# Patient Record
Sex: FEMALE | Race: BLACK OR AFRICAN AMERICAN | NOT HISPANIC OR LATINO | URBAN - METROPOLITAN AREA
[De-identification: names, ages, dates, MRNs, and addresses within clinical notes are randomized per-mention and may not be internally consistent; named-entity substitution may affect disease eponyms.]

---

## 2020-10-26 ENCOUNTER — OFFICE VISIT (OUTPATIENT)
Dept: URBAN - METROPOLITAN AREA CLINIC 23 | Facility: CLINIC | Age: 40
End: 2020-10-26

## 2020-11-10 ENCOUNTER — OFFICE VISIT (OUTPATIENT)
Dept: URBAN - METROPOLITAN AREA CLINIC 23 | Facility: CLINIC | Age: 40
End: 2020-11-10
Payer: COMMERCIAL

## 2020-11-10 ENCOUNTER — WEB ENCOUNTER (OUTPATIENT)
Dept: URBAN - METROPOLITAN AREA CLINIC 23 | Facility: CLINIC | Age: 40
End: 2020-11-10

## 2020-11-10 ENCOUNTER — LAB OUTSIDE AN ENCOUNTER (OUTPATIENT)
Dept: URBAN - METROPOLITAN AREA CLINIC 23 | Facility: CLINIC | Age: 40
End: 2020-11-10

## 2020-11-10 DIAGNOSIS — R68.81 EARLY SATIETY: ICD-10-CM

## 2020-11-10 DIAGNOSIS — K21.00 GASTROESOPHAGEAL REFLUX DISEASE WITH ESOPHAGITIS WITHOUT HEMORRHAGE: ICD-10-CM

## 2020-11-10 DIAGNOSIS — A04.8 H. PYLORI INFECTION: ICD-10-CM

## 2020-11-10 PROCEDURE — G8427 DOCREV CUR MEDS BY ELIG CLIN: HCPCS | Performed by: INTERNAL MEDICINE

## 2020-11-10 PROCEDURE — G8420 CALC BMI NORM PARAMETERS: HCPCS | Performed by: INTERNAL MEDICINE

## 2020-11-10 PROCEDURE — G9622 NO UNHEAL ETOH USER: HCPCS | Performed by: INTERNAL MEDICINE

## 2020-11-10 PROCEDURE — 1036F TOBACCO NON-USER: CPT | Performed by: INTERNAL MEDICINE

## 2020-11-10 PROCEDURE — 99214 OFFICE O/P EST MOD 30 MIN: CPT | Performed by: INTERNAL MEDICINE

## 2020-11-10 PROCEDURE — 3017F COLORECTAL CA SCREEN DOC REV: CPT | Performed by: INTERNAL MEDICINE

## 2020-11-10 PROCEDURE — G9903 PT SCRN TBCO ID AS NON USER: HCPCS | Performed by: INTERNAL MEDICINE

## 2020-11-10 RX ORDER — HYDROXYCHLOROQUINE SULFATE 200 MG/1
TAKE 1 TABLET (200 MG) BY ORAL ROUTE ONCE DAILY TABLET ORAL 1
Qty: 0 | Refills: 0 | Status: ACTIVE | COMMUNITY
Start: 1900-01-01 | End: 1900-01-01

## 2020-11-10 RX ORDER — METHOTREXATE 2.5 MG/1
TABLET ORAL
Qty: 0 | Refills: 0 | Status: ACTIVE | COMMUNITY
Start: 1900-01-01 | End: 1900-01-01

## 2020-11-10 RX ORDER — PREDNISONE 5 MG/1
TABLET ORAL
Qty: 0 | Refills: 0 | Status: ACTIVE | COMMUNITY
Start: 1900-01-01 | End: 1900-01-01

## 2020-11-10 RX ORDER — PANTOPRAZOLE SODIUM 40 MG/1
TAKE 1 TABLET BY ORAL ROUTE BID FOR 90 DAYS, 30 MINS BEFORE BREAKFAST AND DINNER TABLET, DELAYED RELEASE ORAL 2
Qty: 180 | Refills: 1 | Status: ACTIVE | COMMUNITY
Start: 2020-05-14 | End: 2020-11-10

## 2020-11-10 NOTE — HPI-TODAY'S VISIT:
41-year-old -American female with lupus who presents for follow-up of GERD.  She had EGD and colonoscopy done for anemia in May 2020, she was found to have moderate stenosis at the GE junction with Schatzki's ring.  Which was dilated with TTS balloon up to 15 mm.  She denies any dysphagia at this moment.   Biopsy from the stomach revealed H. pylori positive, completed 2 weeks of antibiotic treatment without side effect.  Her major complaint today is uncontrolled reflux symptoms.  She has been taking pantoprazole twice a day and also fasting 3-4 hours before bedtime.  Still she has heartburn frequently especially when she lies down.   She also reports early satiety and chronic indigestion.  Not feeling hungry for many hours after small meal. She also figured she has lactose intolerance.  She has been using Lactaid milk which is helping.  Also avoiding foods with lactose.

## 2020-11-10 NOTE — PREVIOUS WORKUP REVIEWED
:ENDOSCOPIES: -EGD 5/14/2020: moderately severe esophagitis. A low-grade the narrowing Schatzki's ring at GE junction. Dilated up to 15 mm using balloon dilator. A small hiatal hernia. Minimal inflammation in the stomach. Normal duodenum.-Colonoscopy 5/14/2020: normal TI. A large fecal lith in the cecum. Stick to the mucosa. Diverticulosis in the sigmoid colon.*Pathology: duodenum-chronic duodenitis. Gastric body and antrum-H. pylori gastritis. No intestinal metaplasia or dysplasia. GE junction-reflux associated changes. No intestinal metaplasia.LABS: -Labs 4/18/2020: WBC 4.2, hemoglobin 11.4, platelet 197. Total bilirubin 0.6, alkaline phosphatase 31, , ALT 17, BUN 10, creatinine 0.75.

## 2020-11-26 ENCOUNTER — TELEPHONE ENCOUNTER (OUTPATIENT)
Dept: URBAN - METROPOLITAN AREA CLINIC 23 | Facility: CLINIC | Age: 40
End: 2020-11-26

## 2020-11-26 LAB — H PYLORI BREATH TEST: POSITIVE

## 2020-11-26 RX ORDER — OMEPRAZOLE 20 MG/1
1 CAPSULE 30 MINUTES BEFORE MEAL CAPSULE, DELAYED RELEASE ORAL BID
Qty: 28 | Refills: 0 | OUTPATIENT

## 2020-11-26 RX ORDER — BISMUTH SUBSALICYLATE 262 MG/1
2 TABLETS TABLET, CHEWABLE ORAL QID
Qty: 112 TABLET | Refills: 0 | OUTPATIENT
Start: 2020-11-26 | End: 2020-12-10

## 2020-11-26 RX ORDER — TETRACYCLINE HYDROCHLORIDE 500 MG/1
1 CAPSULE CAPSULE ORAL QID
Qty: 56 CAPSULE | Refills: 0 | OUTPATIENT
Start: 2020-11-26 | End: 2020-12-10

## 2020-11-26 RX ORDER — METRONIDAZOLE 500 MG/1
1 TABLET TABLET ORAL QID
Qty: 56 TABLET | Refills: 0 | OUTPATIENT
Start: 2020-11-26 | End: 2020-12-10

## 2020-12-02 ENCOUNTER — TELEPHONE ENCOUNTER (OUTPATIENT)
Dept: URBAN - METROPOLITAN AREA CLINIC 77 | Facility: CLINIC | Age: 40
End: 2020-12-02

## 2020-12-02 RX ORDER — DOXYCYCLINE HYCLATE 100 MG/1
1 TABLET CAPSULE, GELATIN COATED ORAL TWICE A DAY
Qty: 28 TABLET | Refills: 0 | OUTPATIENT
Start: 2020-12-03 | End: 2020-12-17

## 2020-12-04 ENCOUNTER — TELEPHONE ENCOUNTER (OUTPATIENT)
Dept: URBAN - METROPOLITAN AREA CLINIC 23 | Facility: CLINIC | Age: 40
End: 2020-12-04

## 2020-12-11 ENCOUNTER — TELEPHONE ENCOUNTER (OUTPATIENT)
Dept: URBAN - METROPOLITAN AREA CLINIC 77 | Facility: CLINIC | Age: 40
End: 2020-12-11

## 2020-12-23 ENCOUNTER — OFFICE VISIT (OUTPATIENT)
Dept: URBAN - METROPOLITAN AREA CLINIC 78 | Facility: CLINIC | Age: 40
End: 2020-12-23
Payer: COMMERCIAL

## 2020-12-23 VITALS
BODY MASS INDEX: 28.41 KG/M2 | HEART RATE: 80 BPM | HEIGHT: 64 IN | WEIGHT: 166.4 LBS | TEMPERATURE: 97.7 F | SYSTOLIC BLOOD PRESSURE: 113 MMHG | DIASTOLIC BLOOD PRESSURE: 79 MMHG

## 2020-12-23 DIAGNOSIS — A04.8 H. PYLORI INFECTION: ICD-10-CM

## 2020-12-23 DIAGNOSIS — K21.9 CHRONIC GERD: ICD-10-CM

## 2020-12-23 DIAGNOSIS — K22.2 ESOPHAGEAL STENOSIS: ICD-10-CM

## 2020-12-23 PROCEDURE — 3017F COLORECTAL CA SCREEN DOC REV: CPT | Performed by: INTERNAL MEDICINE

## 2020-12-23 PROCEDURE — 1036F TOBACCO NON-USER: CPT | Performed by: INTERNAL MEDICINE

## 2020-12-23 PROCEDURE — G9622 NO UNHEAL ETOH USER: HCPCS | Performed by: INTERNAL MEDICINE

## 2020-12-23 PROCEDURE — G9903 PT SCRN TBCO ID AS NON USER: HCPCS | Performed by: INTERNAL MEDICINE

## 2020-12-23 PROCEDURE — G8420 CALC BMI NORM PARAMETERS: HCPCS | Performed by: INTERNAL MEDICINE

## 2020-12-23 PROCEDURE — 99213 OFFICE O/P EST LOW 20 MIN: CPT | Performed by: INTERNAL MEDICINE

## 2020-12-23 PROCEDURE — G8427 DOCREV CUR MEDS BY ELIG CLIN: HCPCS | Performed by: INTERNAL MEDICINE

## 2020-12-23 RX ORDER — HYDROXYCHLOROQUINE SULFATE 200 MG/1
TAKE 1 TABLET (200 MG) BY ORAL ROUTE ONCE DAILY TABLET ORAL 1
Qty: 0 | Refills: 0 | Status: ACTIVE | COMMUNITY
Start: 1900-01-01

## 2020-12-23 RX ORDER — METHOTREXATE 2.5 MG/1
TABLET ORAL
Qty: 0 | Refills: 0 | Status: ACTIVE | COMMUNITY
Start: 1900-01-01

## 2020-12-23 RX ORDER — PREDNISONE 5 MG/1
TABLET ORAL
Qty: 0 | Refills: 0 | Status: ACTIVE | COMMUNITY
Start: 1900-01-01

## 2020-12-23 NOTE — PREVIOUS WORKUP REVIEWED
:, :ENDOSCOPIES: -EGD 5/14/2020: moderately severe esophagitis. A low-grade the narrowing Schatzki's ring at GE junction. Dilated up to 15 mm using balloon dilator. A small hiatal hernia. Minimal inflammation in the stomach. Normal duodenum.-Colonoscopy 5/14/2020: normal TI. A large fecal lith in the cecum. Stick to the mucosa. Diverticulosis in the sigmoid colon.*Pathology: duodenum-chronic duodenitis. Gastric body and antrum-H. pylori gastritis. No intestinal metaplasia or dysplasia. GE junction-reflux associated changes. No intestinal metaplasia.LABS: -H pylori breath test 11/24/2020: POSITIVE.-Labs 4/18/2020: WBC 4.2, hemoglobin 11.4, platelet 197. Total bilirubin 0.6, alkaline phosphatase 31, , ALT 17, BUN 10, creatinine 0.75.

## 2020-12-23 NOTE — HPI-TODAY'S VISIT:
40-year-old -American female with GERD with moderate stenosis at GE junction, H. pylori gastritis presents for follow-up.  She was able to finish only 10 days of quadruple regimen.  She had a difficulty with the Flagyl causing headache and nausea vomiting but she was able to finish the course of 10 days.  (Prescribed as 14 days) Currently she feels better.  No GI symptoms.   She did not have gastric emptying test since her symptom has resolved.  She takes pantoprazole daily. Denies dysphagia.

## 2021-01-13 PROBLEM — 235595009: Status: ACTIVE | Noted: 2020-12-23

## 2021-01-25 ENCOUNTER — OFFICE VISIT (OUTPATIENT)
Dept: URBAN - METROPOLITAN AREA CLINIC 77 | Facility: CLINIC | Age: 41
End: 2021-01-25

## 2021-04-15 ENCOUNTER — WEB ENCOUNTER (OUTPATIENT)
Dept: URBAN - METROPOLITAN AREA CLINIC 23 | Facility: CLINIC | Age: 41
End: 2021-04-15

## 2021-04-15 ENCOUNTER — OFFICE VISIT (OUTPATIENT)
Dept: URBAN - METROPOLITAN AREA CLINIC 22 | Facility: CLINIC | Age: 41
End: 2021-04-15

## 2021-04-15 RX ORDER — METHOTREXATE 2.5 MG/1
TABLET ORAL
Qty: 0 | Refills: 0 | Status: ACTIVE | COMMUNITY
Start: 1900-01-01

## 2021-04-15 RX ORDER — PREDNISONE 5 MG/1
TABLET ORAL
Qty: 0 | Refills: 0 | Status: ACTIVE | COMMUNITY
Start: 1900-01-01

## 2021-04-15 RX ORDER — HYDROXYCHLOROQUINE SULFATE 200 MG/1
TAKE 1 TABLET (200 MG) BY ORAL ROUTE ONCE DAILY TABLET ORAL 1
Qty: 0 | Refills: 0 | Status: ACTIVE | COMMUNITY
Start: 1900-01-01

## 2022-12-20 ENCOUNTER — OFFICE VISIT (OUTPATIENT)
Dept: URBAN - METROPOLITAN AREA CLINIC 37 | Facility: CLINIC | Age: 42
End: 2022-12-20
Payer: COMMERCIAL

## 2022-12-20 VITALS
HEIGHT: 64 IN | SYSTOLIC BLOOD PRESSURE: 136 MMHG | BODY MASS INDEX: 28.09 KG/M2 | OXYGEN SATURATION: 97 % | WEIGHT: 164.5 LBS | HEART RATE: 57 BPM | DIASTOLIC BLOOD PRESSURE: 86 MMHG

## 2022-12-20 DIAGNOSIS — D62 ANEMIA DUE TO ACUTE BLOOD LOSS: ICD-10-CM

## 2022-12-20 DIAGNOSIS — R10.13 EPIGASTRIC PAIN: ICD-10-CM

## 2022-12-20 DIAGNOSIS — K22.2 ESOPHAGEAL STENOSIS: ICD-10-CM

## 2022-12-20 DIAGNOSIS — M32.9 LUPUS: ICD-10-CM

## 2022-12-20 DIAGNOSIS — Z86.19 HISTORY OF HELICOBACTER INFECTION: ICD-10-CM

## 2022-12-20 PROBLEM — 200936003: Status: ACTIVE | Noted: 2022-12-20

## 2022-12-20 PROCEDURE — 99214 OFFICE O/P EST MOD 30 MIN: CPT | Performed by: NURSE PRACTITIONER

## 2022-12-20 RX ORDER — PANTOPRAZOLE SODIUM 40 MG/1
1 TABLET TABLET, DELAYED RELEASE ORAL ONCE A DAY
Qty: 30 | Refills: 3 | OUTPATIENT
Start: 2022-12-20

## 2022-12-20 RX ORDER — HYDROXYCHLOROQUINE SULFATE 200 MG/1
TAKE 1 TABLET (200 MG) BY ORAL ROUTE ONCE DAILY TABLET ORAL 1
Qty: 0 | Refills: 0 | Status: ACTIVE | COMMUNITY
Start: 1900-01-01

## 2022-12-20 RX ORDER — BELIMUMAB 200 MG/ML
SOLUTION SUBCUTANEOUS
Qty: 4 MILLILITER | Status: ACTIVE | COMMUNITY

## 2022-12-20 RX ORDER — SUCRALFATE 1 G/10ML
10 ML ON AN EMPTY STOMACH SUSPENSION ORAL TWICE A DAY
Qty: 600 | OUTPATIENT
Start: 2022-12-20 | End: 2023-01-19

## 2022-12-20 RX ORDER — OMEPRAZOLE 40 MG/1
1 CAPSULE 30 MINUTES BEFORE MORNING MEAL CAPSULE, DELAYED RELEASE ORAL ONCE A DAY
Status: ACTIVE | COMMUNITY

## 2022-12-20 RX ORDER — PREDNISONE 5 MG/1
TABLET ORAL
Qty: 0 | Refills: 0 | Status: ACTIVE | COMMUNITY
Start: 1900-01-01

## 2022-12-22 ENCOUNTER — OFFICE VISIT (OUTPATIENT)
Dept: URBAN - METROPOLITAN AREA CLINIC 37 | Facility: CLINIC | Age: 42
End: 2022-12-22

## 2022-12-29 ENCOUNTER — OFFICE VISIT (OUTPATIENT)
Dept: URBAN - METROPOLITAN AREA CLINIC 37 | Facility: CLINIC | Age: 42
End: 2022-12-29

## 2022-12-29 RX ORDER — HYDROXYCHLOROQUINE SULFATE 200 MG/1
TAKE 1 TABLET (200 MG) BY ORAL ROUTE ONCE DAILY TABLET ORAL 1
Qty: 0 | Refills: 0 | Status: ACTIVE | COMMUNITY
Start: 1900-01-01

## 2022-12-29 RX ORDER — PREDNISONE 5 MG/1
TABLET ORAL
Qty: 0 | Refills: 0 | Status: ACTIVE | COMMUNITY
Start: 1900-01-01

## 2022-12-29 RX ORDER — SUCRALFATE 1 G/10ML
10 ML ON AN EMPTY STOMACH SUSPENSION ORAL TWICE A DAY
Qty: 600 | Status: ACTIVE | COMMUNITY
Start: 2022-12-20 | End: 2023-01-19

## 2022-12-29 RX ORDER — PANTOPRAZOLE SODIUM 40 MG/1
1 TABLET TABLET, DELAYED RELEASE ORAL ONCE A DAY
Qty: 30 | Refills: 3 | Status: ACTIVE | COMMUNITY
Start: 2022-12-20

## 2022-12-29 RX ORDER — OMEPRAZOLE 40 MG/1
1 CAPSULE 30 MINUTES BEFORE MORNING MEAL CAPSULE, DELAYED RELEASE ORAL ONCE A DAY
Status: ACTIVE | COMMUNITY

## 2022-12-29 RX ORDER — BELIMUMAB 200 MG/ML
SOLUTION SUBCUTANEOUS
Qty: 4 MILLILITER | Status: ACTIVE | COMMUNITY

## 2023-01-03 ENCOUNTER — OFFICE VISIT (OUTPATIENT)
Dept: URBAN - METROPOLITAN AREA CLINIC 35 | Facility: CLINIC | Age: 43
End: 2023-01-03
Payer: COMMERCIAL

## 2023-01-03 VITALS
OXYGEN SATURATION: 98 % | HEART RATE: 72 BPM | SYSTOLIC BLOOD PRESSURE: 124 MMHG | HEIGHT: 64 IN | BODY MASS INDEX: 28.34 KG/M2 | WEIGHT: 166 LBS | DIASTOLIC BLOOD PRESSURE: 76 MMHG

## 2023-01-03 DIAGNOSIS — K57.90 DIVERTICULOSIS: ICD-10-CM

## 2023-01-03 DIAGNOSIS — D50.0 IRON DEFICIENCY ANEMIA DUE TO CHRONIC BLOOD LOSS: ICD-10-CM

## 2023-01-03 DIAGNOSIS — K64.9 HEMORRHOIDS, UNSPECIFIED HEMORRHOID TYPE: ICD-10-CM

## 2023-01-03 DIAGNOSIS — K22.2 ESOPHAGEAL STENOSIS: ICD-10-CM

## 2023-01-03 DIAGNOSIS — K21.9 CHRONIC GERD: ICD-10-CM

## 2023-01-03 PROBLEM — 397881000: Status: ACTIVE | Noted: 2023-01-03

## 2023-01-03 PROBLEM — 724556004: Status: ACTIVE | Noted: 2023-01-03

## 2023-01-03 PROBLEM — 70153002: Status: ACTIVE | Noted: 2023-01-03

## 2023-01-03 PROCEDURE — 99214 OFFICE O/P EST MOD 30 MIN: CPT | Performed by: INTERNAL MEDICINE

## 2023-01-03 RX ORDER — LISINOPRIL 20 MG/1
1 TABLET TABLET ORAL ONCE A DAY
Status: ACTIVE | COMMUNITY

## 2023-01-03 RX ORDER — HYDROXYCHLOROQUINE SULFATE 200 MG/1
TAKE 1 TABLET (200 MG) BY ORAL ROUTE ONCE DAILY TABLET ORAL 1
Qty: 0 | Refills: 0 | Status: ACTIVE | COMMUNITY
Start: 1900-01-01

## 2023-01-03 RX ORDER — SUCRALFATE 1 G/10ML
10 ML ON AN EMPTY STOMACH SUSPENSION ORAL TWICE A DAY
Qty: 600 | Status: DISCONTINUED | COMMUNITY
Start: 2022-12-20 | End: 2023-01-19

## 2023-01-03 RX ORDER — PANTOPRAZOLE SODIUM 40 MG/1
1 TABLET TABLET, DELAYED RELEASE ORAL ONCE A DAY
Qty: 30 | Refills: 3 | Status: ACTIVE | COMMUNITY
Start: 2022-12-20

## 2023-01-03 RX ORDER — SUCRALFATE 1 G/1
1 TABLET ON AN EMPTY STOMACH TABLET ORAL TWICE A DAY
Qty: 60 | Refills: 1 | OUTPATIENT
Start: 2023-01-03 | End: 2023-03-04

## 2023-01-03 RX ORDER — OMEPRAZOLE 40 MG/1
1 CAPSULE 30 MINUTES BEFORE MORNING MEAL CAPSULE, DELAYED RELEASE ORAL ONCE A DAY
Status: DISCONTINUED | COMMUNITY

## 2023-01-03 RX ORDER — PREDNISONE 5 MG/1
1 TABLET TABLET ORAL ONCE A DAY
Refills: 0 | Status: ACTIVE | COMMUNITY
Start: 1900-01-01

## 2023-01-03 RX ORDER — BELIMUMAB 200 MG/ML
1 ML SOLUTION SUBCUTANEOUS
Status: ACTIVE | COMMUNITY

## 2023-01-03 RX ORDER — PANTOPRAZOLE SODIUM 40 MG/1
1 TABLET TABLET, DELAYED RELEASE ORAL
Qty: 30 | Refills: 2 | OUTPATIENT
Start: 2023-01-03

## 2023-01-15 PROBLEM — 235595009: Status: ACTIVE | Noted: 2021-01-13

## 2023-01-15 PROBLEM — 300286002: Status: ACTIVE | Noted: 2021-01-13

## 2023-01-26 ENCOUNTER — OFFICE VISIT (OUTPATIENT)
Dept: URBAN - METROPOLITAN AREA CLINIC 37 | Facility: CLINIC | Age: 43
End: 2023-01-26

## 2023-02-10 ENCOUNTER — OFFICE VISIT (OUTPATIENT)
Dept: URBAN - METROPOLITAN AREA SURGERY CENTER 8 | Facility: SURGERY CENTER | Age: 43
End: 2023-02-10

## 2023-03-10 ENCOUNTER — OFFICE VISIT (OUTPATIENT)
Dept: URBAN - METROPOLITAN AREA SURGERY CENTER 8 | Facility: SURGERY CENTER | Age: 43
End: 2023-03-10

## 2023-03-14 ENCOUNTER — OFFICE VISIT (OUTPATIENT)
Dept: URBAN - METROPOLITAN AREA CLINIC 35 | Facility: CLINIC | Age: 43
End: 2023-03-14

## 2023-04-04 ENCOUNTER — OFFICE VISIT (OUTPATIENT)
Dept: URBAN - METROPOLITAN AREA CLINIC 35 | Facility: CLINIC | Age: 43
End: 2023-04-04

## 2023-04-14 ENCOUNTER — TELEPHONE ENCOUNTER (OUTPATIENT)
Dept: URBAN - METROPOLITAN AREA CLINIC 36 | Facility: CLINIC | Age: 43
End: 2023-04-14

## 2023-04-21 ENCOUNTER — CLAIMS CREATED FROM THE CLAIM WINDOW (OUTPATIENT)
Dept: URBAN - METROPOLITAN AREA SURGERY CENTER 8 | Facility: SURGERY CENTER | Age: 43
End: 2023-04-21

## 2023-04-21 ENCOUNTER — CLAIMS CREATED FROM THE CLAIM WINDOW (OUTPATIENT)
Dept: URBAN - METROPOLITAN AREA CLINIC 4 | Facility: CLINIC | Age: 43
End: 2023-04-21
Payer: COMMERCIAL

## 2023-04-21 ENCOUNTER — CLAIMS CREATED FROM THE CLAIM WINDOW (OUTPATIENT)
Dept: URBAN - METROPOLITAN AREA SURGERY CENTER 8 | Facility: SURGERY CENTER | Age: 43
End: 2023-04-21
Payer: COMMERCIAL

## 2023-04-21 DIAGNOSIS — K21.9 GASTRO-ESOPHAGEAL REFLUX DISEASE WITHOUT ESOPHAGITIS: ICD-10-CM

## 2023-04-21 DIAGNOSIS — K31.A0 GASTRIC INTESTINAL METAPLASIA, UNSPECIFIED: ICD-10-CM

## 2023-04-21 DIAGNOSIS — R12 BURNING REFLUX: ICD-10-CM

## 2023-04-21 DIAGNOSIS — K29.60 ADENOPAPILLOMATOSIS GASTRICA: ICD-10-CM

## 2023-04-21 PROCEDURE — 88305 TISSUE EXAM BY PATHOLOGIST: CPT | Performed by: PATHOLOGY

## 2023-04-21 PROCEDURE — 43239 EGD BIOPSY SINGLE/MULTIPLE: CPT | Performed by: INTERNAL MEDICINE

## 2023-04-21 PROCEDURE — G8907 PT DOC NO EVENTS ON DISCHARG: HCPCS | Performed by: INTERNAL MEDICINE

## 2023-04-21 PROCEDURE — 88342 IMHCHEM/IMCYTCHM 1ST ANTB: CPT | Performed by: PATHOLOGY

## 2023-04-21 PROCEDURE — 88312 SPECIAL STAINS GROUP 1: CPT | Performed by: PATHOLOGY

## 2023-04-21 RX ORDER — HYDROXYCHLOROQUINE SULFATE 200 MG/1
TAKE 1 TABLET (200 MG) BY ORAL ROUTE ONCE DAILY TABLET ORAL 1
Qty: 0 | Refills: 0 | Status: ACTIVE | COMMUNITY
Start: 1900-01-01

## 2023-04-21 RX ORDER — PANTOPRAZOLE SODIUM 40 MG/1
1 TABLET TABLET, DELAYED RELEASE ORAL ONCE A DAY
Qty: 30 | Refills: 3 | Status: ACTIVE | COMMUNITY
Start: 2022-12-20

## 2023-04-21 RX ORDER — PREDNISONE 5 MG/1
1 TABLET TABLET ORAL ONCE A DAY
Refills: 0 | Status: ACTIVE | COMMUNITY
Start: 1900-01-01

## 2023-04-21 RX ORDER — PANTOPRAZOLE SODIUM 40 MG/1
1 TABLET TABLET, DELAYED RELEASE ORAL
Qty: 30 | Refills: 2 | Status: ACTIVE | COMMUNITY
Start: 2023-01-03

## 2023-04-21 RX ORDER — BELIMUMAB 200 MG/ML
1 ML SOLUTION SUBCUTANEOUS
Status: ACTIVE | COMMUNITY

## 2023-04-21 RX ORDER — LISINOPRIL 20 MG/1
1 TABLET TABLET ORAL ONCE A DAY
Status: ACTIVE | COMMUNITY

## 2023-05-08 PROBLEM — 4556007: Status: ACTIVE | Noted: 2023-05-08

## 2023-05-09 ENCOUNTER — DASHBOARD ENCOUNTERS (OUTPATIENT)
Age: 43
End: 2023-05-09

## 2023-05-09 ENCOUNTER — OFFICE VISIT (OUTPATIENT)
Dept: URBAN - METROPOLITAN AREA CLINIC 35 | Facility: CLINIC | Age: 43
End: 2023-05-09
Payer: COMMERCIAL

## 2023-05-09 VITALS — BODY MASS INDEX: 27.49 KG/M2 | WEIGHT: 161 LBS | HEIGHT: 64 IN

## 2023-05-09 DIAGNOSIS — K52.9 COLITIS: ICD-10-CM

## 2023-05-09 DIAGNOSIS — K21.9 GASTRO-ESOPHAGEAL REFLUX DISEASE WITHOUT ESOPHAGITIS: ICD-10-CM

## 2023-05-09 DIAGNOSIS — K29.70 GASTRITIS WITHOUT BLEEDING, UNSPECIFIED CHRONICITY, UNSPECIFIED GASTRITIS TYPE: ICD-10-CM

## 2023-05-09 DIAGNOSIS — K57.90 DIVERTICULOSIS: ICD-10-CM

## 2023-05-09 DIAGNOSIS — K64.9 HEMORRHOIDS, UNSPECIFIED HEMORRHOID TYPE: ICD-10-CM

## 2023-05-09 DIAGNOSIS — D50.0 IRON DEFICIENCY ANEMIA DUE TO CHRONIC BLOOD LOSS: ICD-10-CM

## 2023-05-09 PROBLEM — 64226004: Status: ACTIVE | Noted: 2023-05-09

## 2023-05-09 PROCEDURE — 99214 OFFICE O/P EST MOD 30 MIN: CPT | Performed by: INTERNAL MEDICINE

## 2023-05-09 RX ORDER — SUCRALFATE 1 G/1
1 TABLET ON AN EMPTY STOMACH TABLET ORAL TWICE A DAY
Qty: 60 | Refills: 3
Start: 2023-05-09 | End: 2023-09-06

## 2023-05-09 RX ORDER — HYDROXYCHLOROQUINE SULFATE 200 MG/1
TAKE 1 TABLET (200 MG) BY ORAL ROUTE ONCE DAILY TABLET ORAL 1
Qty: 0 | Refills: 0 | Status: ACTIVE | COMMUNITY
Start: 1900-01-01

## 2023-05-09 RX ORDER — SUCRALFATE 1 G/1
1 TABLET ON AN EMPTY STOMACH TABLET ORAL TWICE A DAY
Status: ACTIVE | COMMUNITY

## 2023-05-09 RX ORDER — BELIMUMAB 200 MG/ML
1 ML SOLUTION SUBCUTANEOUS
Status: ACTIVE | COMMUNITY

## 2023-05-09 RX ORDER — PANTOPRAZOLE SODIUM 40 MG/1
1 TABLET TABLET, DELAYED RELEASE ORAL ONCE A DAY
Qty: 30 | Refills: 3 | Status: ACTIVE | COMMUNITY
Start: 2022-12-20

## 2023-05-09 RX ORDER — LISINOPRIL 20 MG/1
1 TABLET TABLET ORAL ONCE A DAY
Status: ACTIVE | COMMUNITY

## 2023-05-09 RX ORDER — PREDNISONE 5 MG/1
1 TABLET TABLET ORAL ONCE A DAY
Refills: 0 | Status: ON HOLD | COMMUNITY
Start: 1900-01-01

## 2023-08-08 ENCOUNTER — OFFICE VISIT (OUTPATIENT)
Dept: URBAN - METROPOLITAN AREA CLINIC 35 | Facility: CLINIC | Age: 43
End: 2023-08-08

## 2023-08-11 ENCOUNTER — TELEPHONE ENCOUNTER (OUTPATIENT)
Dept: URBAN - METROPOLITAN AREA CLINIC 35 | Facility: CLINIC | Age: 43
End: 2023-08-11

## 2023-08-11 RX ORDER — SUCRALFATE 1 G/1
1 TABLET ON AN EMPTY STOMACH TABLET ORAL
Qty: 30 | Refills: 1
Start: 2023-05-09 | End: 2023-10-10

## 2025-05-07 ENCOUNTER — OFFICE VISIT (OUTPATIENT)
Dept: URBAN - METROPOLITAN AREA CLINIC 35 | Facility: CLINIC | Age: 45
End: 2025-05-07
Payer: COMMERCIAL

## 2025-05-07 ENCOUNTER — LAB OUTSIDE AN ENCOUNTER (OUTPATIENT)
Dept: URBAN - METROPOLITAN AREA CLINIC 35 | Facility: CLINIC | Age: 45
End: 2025-05-07

## 2025-05-07 DIAGNOSIS — R19.4 CHANGE IN BOWEL HABITS: ICD-10-CM

## 2025-05-07 DIAGNOSIS — R11.14 BILIOUS VOMITING WITH NAUSEA: ICD-10-CM

## 2025-05-07 DIAGNOSIS — R10.30 LOWER ABDOMINAL PAIN: ICD-10-CM

## 2025-05-07 PROCEDURE — 99214 OFFICE O/P EST MOD 30 MIN: CPT | Performed by: NURSE PRACTITIONER

## 2025-05-07 RX ORDER — SILDENAFIL 25 MG/1
TAKE ONE TABLET BY MOUTH UP TO TWICE A DAY AS NEEDED FOR RAYNAUD'S TABLET, FILM COATED ORAL
Qty: 30 UNSPECIFIED | Refills: 1 | Status: ACTIVE | COMMUNITY

## 2025-05-07 RX ORDER — NIFEDIPINE 60 MG/1
TABLET, EXTENDED RELEASE ORAL
Qty: 90 TABLET | Status: ACTIVE | COMMUNITY

## 2025-05-07 RX ORDER — LISINOPRIL 20 MG/1
1 TABLET TABLET ORAL ONCE A DAY
Status: ACTIVE | COMMUNITY

## 2025-05-07 RX ORDER — SIMETHICONE 125 MG/1
1 TABLET AFTER LUNCH AND DINNER TABLET, CHEWABLE ORAL
Qty: 60 TABLET | Refills: 2 | OUTPATIENT
Start: 2025-05-07 | End: 2025-08-05

## 2025-05-07 RX ORDER — OMEPRAZOLE 40 MG/1
1 CAPSULE 1/2 TO 1 HOUR BEFORE MORNING MEAL CAPSULE, DELAYED RELEASE ORAL ONCE A DAY
Qty: 30 | Refills: 3 | OUTPATIENT
Start: 2025-05-07

## 2025-05-07 RX ORDER — HYDROXYCHLOROQUINE SULFATE 200 MG/1
TAKE 1 TABLET (200 MG) BY ORAL ROUTE ONCE DAILY TABLET ORAL 1
Qty: 0 | Refills: 0 | Status: ACTIVE | COMMUNITY
Start: 1900-01-01

## 2025-05-07 RX ORDER — SUCRALFATE 1 G/1
1 TABLET ON AN EMPTY STOMACH TABLET ORAL TWICE A DAY
Status: ACTIVE | COMMUNITY

## 2025-05-07 RX ORDER — BELIMUMAB 200 MG/ML
1 ML SOLUTION SUBCUTANEOUS
Status: ACTIVE | COMMUNITY

## 2025-05-07 RX ORDER — SUCRALFATE 1 G/10ML
10 ML 1 HOUR BEFORE MEALS AND AT BEDTIME ON AN EMPTY STOMACH SUSPENSION ORAL TWICE A DAY
Qty: 600 | Refills: 2 | OUTPATIENT
Start: 2025-05-07

## 2025-05-07 RX ORDER — ONDANSETRON 4 MG/1
1 TABLET ON THE TONGUE AND ALLOW TO DISSOLVE TABLET, ORALLY DISINTEGRATING ORAL EVERY 8 HOURS
Qty: 42 | Refills: 1 | OUTPATIENT
Start: 2025-05-07

## 2025-05-13 ENCOUNTER — TELEPHONE ENCOUNTER (OUTPATIENT)
Dept: URBAN - METROPOLITAN AREA CLINIC 35 | Facility: CLINIC | Age: 45
End: 2025-05-13

## 2025-05-13 ENCOUNTER — TELEPHONE ENCOUNTER (OUTPATIENT)
Dept: URBAN - METROPOLITAN AREA CLINIC 37 | Facility: CLINIC | Age: 45
End: 2025-05-13

## 2025-05-14 ENCOUNTER — TELEPHONE ENCOUNTER (OUTPATIENT)
Dept: URBAN - METROPOLITAN AREA CLINIC 19 | Facility: CLINIC | Age: 45
End: 2025-05-14

## 2025-05-14 PROBLEM — 116290004: Status: ACTIVE | Noted: 2025-05-14

## 2025-05-14 PROBLEM — 328021000119108: Status: ACTIVE | Noted: 2025-05-14

## 2025-05-15 ENCOUNTER — OFFICE VISIT (OUTPATIENT)
Dept: URBAN - METROPOLITAN AREA CLINIC 19 | Facility: CLINIC | Age: 45
End: 2025-05-15
Payer: COMMERCIAL

## 2025-05-15 ENCOUNTER — OFFICE VISIT (OUTPATIENT)
Dept: URBAN - METROPOLITAN AREA CLINIC 128 | Facility: CLINIC | Age: 45
End: 2025-05-15
Payer: COMMERCIAL

## 2025-05-15 DIAGNOSIS — K27.9 PUD (PEPTIC ULCER DISEASE): ICD-10-CM

## 2025-05-15 DIAGNOSIS — Z87.19 HISTORY OF INTESTINAL OBSTRUCTION: ICD-10-CM

## 2025-05-15 DIAGNOSIS — Z12.11 COLON CANCER SCREENING: ICD-10-CM

## 2025-05-15 DIAGNOSIS — R10.84 ABDOMINAL CRAMPING, GENERALIZED: ICD-10-CM

## 2025-05-15 DIAGNOSIS — R19.7 ACUTE DIARRHEA: ICD-10-CM

## 2025-05-15 PROBLEM — 13200003: Status: ACTIVE | Noted: 2025-05-15

## 2025-05-15 PROCEDURE — 993 APS NON BILLABLE: Performed by: STUDENT IN AN ORGANIZED HEALTH CARE EDUCATION/TRAINING PROGRAM

## 2025-05-15 PROCEDURE — 99214 OFFICE O/P EST MOD 30 MIN: CPT | Performed by: INTERNAL MEDICINE

## 2025-05-15 RX ORDER — BELIMUMAB 200 MG/ML
1 ML SOLUTION SUBCUTANEOUS
Status: ACTIVE | COMMUNITY

## 2025-05-15 RX ORDER — SIMETHICONE 125 MG/1
1 TABLET AFTER LUNCH AND DINNER TABLET, CHEWABLE ORAL
Qty: 60 TABLET | Refills: 2 | Status: ON HOLD | COMMUNITY
Start: 2025-05-07 | End: 2025-08-05

## 2025-05-15 RX ORDER — HYDROXYCHLOROQUINE SULFATE 200 MG/1
TAKE 1 TABLET (200 MG) BY ORAL ROUTE ONCE DAILY TABLET ORAL 1
Qty: 0 | Refills: 0 | Status: ACTIVE | COMMUNITY
Start: 1900-01-01

## 2025-05-15 RX ORDER — SUCRALFATE 1 G/1
1 TABLET ON AN EMPTY STOMACH TABLET ORAL TWICE A DAY
Status: ACTIVE | COMMUNITY

## 2025-05-15 RX ORDER — SUCRALFATE 1 G/1
1 TABLET ON AN EMPTY STOMACH TABLET ORAL TWICE A DAY
Status: ON HOLD | COMMUNITY

## 2025-05-15 RX ORDER — OMEPRAZOLE 40 MG/1
1 CAPSULE 1/2 TO 1 HOUR BEFORE MORNING MEAL CAPSULE, DELAYED RELEASE ORAL ONCE A DAY
Qty: 30 | Refills: 3 | Status: ACTIVE | COMMUNITY
Start: 2025-05-07

## 2025-05-15 RX ORDER — NIFEDIPINE 60 MG/1
TABLET, EXTENDED RELEASE ORAL
Qty: 90 TABLET | Status: ACTIVE | COMMUNITY

## 2025-05-15 RX ORDER — SIMETHICONE 125 MG/1
1 TABLET AFTER LUNCH AND DINNER TABLET, CHEWABLE ORAL
Qty: 60 TABLET | Refills: 2 | Status: ACTIVE | COMMUNITY
Start: 2025-05-07 | End: 2025-08-05

## 2025-05-15 RX ORDER — SODIUM SULFATE, MAGNESIUM SULFATE, AND POTASSIUM CHLORIDE 17.75; 2.7; 2.25 G/1; G/1; G/1
12 TABLETS TABLET ORAL
Qty: 24 TABLETS | Refills: 0 | OUTPATIENT
Start: 2025-05-15 | End: 2025-05-16

## 2025-05-15 RX ORDER — SILDENAFIL 25 MG/1
TAKE ONE TABLET BY MOUTH UP TO TWICE A DAY AS NEEDED FOR RAYNAUD'S TABLET, FILM COATED ORAL
Qty: 30 UNSPECIFIED | Refills: 1 | Status: ACTIVE | COMMUNITY

## 2025-05-15 RX ORDER — CIPROFLOXACIN 500 MG/1
1 TABLET TABLET ORAL
Qty: 28 | Refills: 0 | OUTPATIENT
Start: 2025-05-15 | End: 2025-05-29

## 2025-05-15 RX ORDER — SUCRALFATE 1 G/10ML
10 ML 1 HOUR BEFORE MEALS AND AT BEDTIME ON AN EMPTY STOMACH SUSPENSION ORAL TWICE A DAY
Qty: 600 | Refills: 2 | Status: ACTIVE | COMMUNITY
Start: 2025-05-07

## 2025-05-15 RX ORDER — OMEPRAZOLE 40 MG/1
1 CAPSULE 1/2 TO 1 HOUR BEFORE MORNING MEAL CAPSULE, DELAYED RELEASE ORAL ONCE A DAY
Qty: 30 | Refills: 3 | Status: ON HOLD | COMMUNITY
Start: 2025-05-07

## 2025-05-15 RX ORDER — SUCRALFATE 1 G/10ML
10 ML 1 HOUR BEFORE MEALS AND AT BEDTIME ON AN EMPTY STOMACH SUSPENSION ORAL TWICE A DAY
Qty: 600 | Refills: 2 | Status: ON HOLD | COMMUNITY
Start: 2025-05-07

## 2025-05-15 RX ORDER — ONDANSETRON 4 MG/1
1 TABLET ON THE TONGUE AND ALLOW TO DISSOLVE TABLET, ORALLY DISINTEGRATING ORAL EVERY 8 HOURS
Qty: 42 | Refills: 1 | Status: ACTIVE | COMMUNITY
Start: 2025-05-07

## 2025-05-15 RX ORDER — METRONIDAZOLE 500 MG/1
1 TABLET TABLET ORAL THREE TIMES A DAY
Qty: 42 | Refills: 0 | OUTPATIENT
Start: 2025-05-15 | End: 2025-05-29

## 2025-05-15 RX ORDER — DICYCLOMINE HYDROCHLORIDE 20 MG/1
1 TABLET TABLET ORAL THREE TIMES A DAY
Qty: 90 | OUTPATIENT
Start: 2025-05-15

## 2025-05-15 RX ORDER — LISINOPRIL 20 MG/1
1 TABLET TABLET ORAL ONCE A DAY
Status: ACTIVE | COMMUNITY

## 2025-05-15 NOTE — HPI-TODAY'S VISIT:
Mila Ca is a 44-year-old female who presents with ongoing gastrointestinal issues. She reports a recent episode of severe abdominal pain that led her to the emergency room at [facility], where a CT scan was performed. The scan did not reveal any bowel obstruction, but she was diagnosed with viral gastroenteritis. Mila has been experiencing significant discomfort, including the inability to eat or drink without vomiting. She describes a sensation of fullness and nausea, even when consuming small amounts of water. Despite being prescribed medication for vomiting, her symptoms persist, and she is concerned about the prolonged recovery time, which she was informed could take three to six weeks. Mila also mentions her ongoing struggle with acid reflux, for which she is taking Omeprazole. She has not been prescribed probiotics or laxatives, which the provider suggests might help manage her symptoms. Mila is frustrated with the lack of improvement and the repeated referrals to different specialists, including a gastroenterologist, without clear resolution of her symptoms. She expresses concern about her inability to work due to her condition and the financial burden of multiple medical appointments.

## 2025-05-15 NOTE — EXAM-FUNCTIONAL ASSESSMENT
General- no acute distress Eyes- anicteric HENT- normocephalic, atraumatic head Neck- no lymphadenopathy Chest- normal breath sounds Heart- regular rate and rhythm Abdomen- soft, non tender, non distended, bowel sounds present

## 2025-05-15 NOTE — PHYSICAL EXAM SKIN:
no rashes , no jaundice present , good turgor , no masses , no tenderness on palpation Star Wedge Flap Text: The defect edges were debeveled with a #15 scalpel blade.  Given the location of the defect, shape of the defect and the proximity to free margins a star wedge flap was deemed most appropriate.  Using a sterile surgical marker, an appropriate rotation flap was drawn incorporating the defect and placing the expected incisions within the relaxed skin tension lines where possible. The area thus outlined was incised deep to adipose tissue with a #15 scalpel blade.  The skin margins were undermined to an appropriate distance in all directions utilizing iris scissors.

## 2025-05-15 NOTE — HPI-TODAY'S VISIT:
Mila Ca is a 44-year-old female who presents with ongoing abdominal pain, bloating, nausea, and vomiting.  previously seen in the past for gerd with hx of gastric ulcer previously on ppi last egd on 4/21/2024 with unremarakble exam.  biopsies with gastritis but no hp along with reflux esopahgitis prior colon in 5/2020 for [facility] with sigmoid diverticulosis.  normal colon.  no polyps.  5yr f/u given.  recently with 3 weeks of epigastric/ruq pain with n/v assoc post prandial bloating went to [facility] on 5/3/2025 with unremarkable labs, CT with dilated SB loops with a gradual transition point.  no signs of obstruction.  note 7.3 cm right ovarian cyst.  admitted for NGT placement and sbo.  left next day AMA after feeling better before trying orals started eating and noted recurrent abd symptoms seen in [facility] by one of our RANDALL's on 5/7/2025 with restart or ppi and ct ordered.  symptoms worsened and went to [facility] on 5/9/2025 normal labs with a/a of 12/29.  ak phos of 41.  h/h of 13.3/40.9.  ct with unremarkable SB.  noted fluid filled colon pointing to diarrhea illness.  dilated colon at 6.4 cm.  sent home with antiemetics here for ongoing symptoms believes that her symptoms are better but still noting post prandial bloating with epiagastric pain and nausea emeis primarily with foam.  no hematemesis noting diarrhea also with 4-5 bm/day with bss of 6-7 with brown stool post prandial pain with nausea with solids and liquids but responsive to antiemetics Mila denies any recent unusual activities or exposures, new restaurants, new meds, antibiotic use.    no fam hx of gastric cancer, colon cancer or polyps  no other complaints

## 2025-05-15 NOTE — HPI-TODAY'S VISIT:
5/15/2025:  Maurice: 45 yo F seen as hospital follow-up.  Seen at [facility] for abdominal pain with acute onset.  Patient has a history of SBO.  She was recommended GI follow-up for evaluation of bowel obstruction, a surgical emergency versus gastric ulcer, a GI issue.  Labs and CT from ER visit on 5/14/25 reviewed.  Large bowel fluid filled and mildly distended at 6.4 cm suggestive of colitis and diarrhea.  Otherwise normal workup.  Pt given IVF and antiemetics.  Stool studies not obtained.

## 2025-05-15 NOTE — HPI-OTHER HISTORIES
5/7/2025:  PAVITHRA Allen:  * Bloating : the pt is here for reason of  bloating She in fact just left the hospital at [facility]    Upper abdominal pain  Onset of Sx: Started 2 weeks ago Went to [facility] ED on 05/03 and had blood work, CT abd, US and XR: CT Abd Pelv with Contrast:  -Small bowel obstruction with transition in point in the right lower quadrant.  Patient was last seen by Dr. Dixon on 05/09/2023 due to GERD and gastritis. She was prescribed Carafate 1 gm. Characteristics: A "squishing, tight and heavy" upper abdominal pain that lasts all day. The pain would get worse after eating and when she sits up straight.  Associated Sx: Admits to bloating that causes major discomfort, frequent vomiting (multiple episodes of vomiting after eating), indigestion and acid reflux and heartburn. She notices a sour "chemical" taste to her saliva Medication tried: Carafate PRN and it would help relieve some of her Sx Family history of GI malignancy: Previous test/evaluation done: - EGD on 04/21/2023: A small hiatal hernia was present. The Z-line was irregular and was found 36 cm from the incisors. The exam of the esophagus was otherwise normal with distal esophageal bxx showing reflux-type changes, negative for BE or EoE. The entire examined stomach was normal with bxx showing chronic inactive gastritis with histological changes suggestive of treated H. pylori gastritis. -Colonoscopy in 2022 or 2023 : colitis and surveillance in unknown?  Change in bowel habits Onset: started 2 weeks ago Current BM: Watery stools 4-5x daily. Type 7 on BSC. Denies rectal bleeding and melena Medications tried: denies  Plan: Simethicone, sucralfate, omeprazole, CT

## 2025-05-15 NOTE — PHYSICAL EXAM GASTROINTESTINAL
Abdomen , soft, mildly tender in the epigastric/ruq area.  no guarding, nondistended , no guarding or rigidity , no masses palpable , normal bowel sounds , Liver and Spleen , no hepatomegaly present , no hepatosplenomegaly , liver nontender , spleen not palpable

## 2025-05-20 LAB
ADENOVIRUS F 40/41: NOT DETECTED
CAMPYLOBACTER: NOT DETECTED
CLOSTRIDIUM DIFFICILE: NOT DETECTED
ENTAMOEBA HISTOLYTICA: NOT DETECTED
ENTEROAGGREGATIVE E.COLI: NOT DETECTED
ENTEROTOXIGENIC E.COLI: NOT DETECTED
ESCHERICHIA COLI O157: NOT DETECTED
GIARDIA LAMBLIA: NOT DETECTED
NOROVIRUS GI/GII: NOT DETECTED
ROTAVIRUS A: NOT DETECTED
SALMONELLA SPP.: NOT DETECTED
SHIGA-LIKE TOXIN PRODUCING E.COLI: NOT DETECTED
SHIGELLA SPP. / ENTEROINVASIVE E.COLI: NOT DETECTED
VIBRIO PARAHAEMOLYTICUS: NOT DETECTED
VIBRIO SPP.: NOT DETECTED
YERSINIA ENTEROCOLITICA: NOT DETECTED

## 2025-05-23 ENCOUNTER — TELEPHONE ENCOUNTER (OUTPATIENT)
Dept: URBAN - METROPOLITAN AREA CLINIC 128 | Facility: CLINIC | Age: 45
End: 2025-05-23

## 2025-06-10 ENCOUNTER — OFFICE VISIT (OUTPATIENT)
Dept: URBAN - METROPOLITAN AREA CLINIC 35 | Facility: CLINIC | Age: 45
End: 2025-06-10

## 2025-06-27 ENCOUNTER — OFFICE VISIT (OUTPATIENT)
Dept: URBAN - METROPOLITAN AREA SURGERY CENTER 31 | Facility: SURGERY CENTER | Age: 45
End: 2025-06-27